# Patient Record
Sex: FEMALE | Race: WHITE
[De-identification: names, ages, dates, MRNs, and addresses within clinical notes are randomized per-mention and may not be internally consistent; named-entity substitution may affect disease eponyms.]

---

## 2018-09-05 ENCOUNTER — HOSPITAL ENCOUNTER (EMERGENCY)
Dept: HOSPITAL 25 - UCEAST | Age: 48
Discharge: HOME | End: 2018-09-05
Payer: COMMERCIAL

## 2018-09-05 VITALS — DIASTOLIC BLOOD PRESSURE: 72 MMHG | SYSTOLIC BLOOD PRESSURE: 120 MMHG

## 2018-09-05 DIAGNOSIS — Z86.718: ICD-10-CM

## 2018-09-05 DIAGNOSIS — B96.89: ICD-10-CM

## 2018-09-05 DIAGNOSIS — B37.3: ICD-10-CM

## 2018-09-05 DIAGNOSIS — N89.8: Primary | ICD-10-CM

## 2018-09-05 DIAGNOSIS — Z87.891: ICD-10-CM

## 2018-09-05 DIAGNOSIS — Z88.2: ICD-10-CM

## 2018-09-05 DIAGNOSIS — Z79.01: ICD-10-CM

## 2018-09-05 PROCEDURE — 99212 OFFICE O/P EST SF 10 MIN: CPT

## 2018-09-05 PROCEDURE — 87491 CHLMYD TRACH DNA AMP PROBE: CPT

## 2018-09-05 PROCEDURE — 87480 CANDIDA DNA DIR PROBE: CPT

## 2018-09-05 PROCEDURE — 87591 N.GONORRHOEAE DNA AMP PROB: CPT

## 2018-09-05 PROCEDURE — 87661 TRICHOMONAS VAGINALIS AMPLIF: CPT

## 2018-09-05 PROCEDURE — 87510 GARDNER VAG DNA DIR PROBE: CPT

## 2018-09-05 PROCEDURE — G0463 HOSPITAL OUTPT CLINIC VISIT: HCPCS

## 2018-09-05 NOTE — ED
GI/ HPI





- HPI Summary


HPI Summary: 


47-year-old female presents with vaginal discharge for the past couple days.  

She's been using Monistat.  She was recently treated for a uti with an 

antibiotic.  uti symptoms resolved.  Denies any pain.  Patient states discharge 

is itchy.  No foul swelling odor.  No bowel pain.  No nausea no vomiting.  No 

pain with urination.  Has history of diabetes.  is on warfarin. 








- History of Current Complaint


Chief Complaint: UCGU


Time Seen by Provider: 09/05/18 19:12


Stated Complaint: DISCHARGE


Hx Last Menstrual Period: July 2018


Pain Intensity: 0





- Allergy/Home Medications


Allergies/Adverse Reactions: 


 Allergies











Allergy/AdvReac Type Severity Reaction Status Date / Time


 


Sulfa (Sulfonamide Allergy  Headache Verified 09/05/18 19:11





Antibiotics)     


 


environmental Allergy  Congestion Uncoded 05/28/15 07:15











Home Medications: 


 Home Medications





Omeprazole CAP* [Prilosec CAP* 20 MG] 1 tab PO DAILY 09/05/18 [History 

Confirmed 09/05/18]


Warfarin TAB(*) [Coumadin TAB(*)] 1 tab PO DAILY 09/05/18 [History Confirmed 09/ 05/18]











PMH/Surg Hx/FS Hx/Imm Hx


Endocrine/Hematology History: Reports: Hx Diabetes, Hx Thyroid Disease


Cardiovascular History: Reports: Other Cardiovascular Problems/Disorders - 

blood clot disorder





- Surgical History


Surgery Procedure, Year, and Place: left 5th toe, kidney lithotripsy


Infectious Disease History: No


Infectious Disease History: 


   Denies: Traveled Outside the [place] in Last 30 Days





- Family History


Known Family History: 


   Negative: Hypertension





- Social History


Alcohol Use: Occasionally


Substance Use Type: Reports: None


Smoking Status (MU): Former Smoker





Review of Systems


Negative: Fever


Negative: Chest Pain


Negative: Shortness Of Breath


Positive: Other - vaginal discharge.  Negative: Abdominal Pain


All Other Systems Reviewed And Are Negative: Yes





Physical Exam


Triage Information Reviewed: Yes


Vital Signs On Initial Exam: 


 Initial Vitals











Temp Pulse Resp BP Pulse Ox


 


 97.5 F   67   18   120/72   98 


 


 09/05/18 19:08  09/05/18 19:08  09/05/18 19:08  09/05/18 19:08  09/05/18 19:08











Vital Signs Reviewed: Yes


Appearance: Positive: Well-Appearing


Skin: Positive: Warm, Dry


Head/Face: Positive: Normal Head/Face Inspection


Eyes: Positive: Normal, Conjunctiva Clear


ENT: Positive: Pharynx normal


Respiratory/Lung Sounds: Positive: Clear to Auscultation, Breath Sounds Present


Cardiovascular: Positive: Normal, RRR


Abdomen Description: Positive: Nontender, Soft


Bowel Sounds: Positive: Present


Pelvic Exam: Positive: External Exam Normal, Discharge - white


Musculoskeletal: Positive: Normal


Neurological: Positive: Normal


Psychiatric: Positive: Normal





Diagnostics





- Vital Signs


 Vital Signs











  Temp Pulse Resp BP Pulse Ox


 


 09/05/18 19:08  97.5 F  67  18  120/72  98














- Laboratory


Lab Statement: Any lab studies that have been ordered have been reviewed, and 

results considered in the medical decision making process.





GIGU Course/Dx





- Course


Course Of Treatment: 47-year-old female presents with vaginal discharge for the 

past couple days.  She's been using Monistat.  She was recently treated for a 

uti with an antibiotic.  uti symptoms resolved.  Denies any pain.  Patient 

states discharge is itchy.  No foul swelling odor.  No bowel pain.  No nausea 

no vomiting.  No pain with urination.  Has history of diabetes.  is on 

warfarin.  On exam nontender abdomen.  Has white discharge present.  We'll 

treat with Diflucan told may elevate inr. got cultures to make sure not BV as 

has been having more sex recently. told if comes back positive for BV will 

treat then.  Patient understands agrees with plan.





- Diagnoses


Differential Diagnoses - Female: Candidiasis, STD, Vaginitis


Provider Diagnoses: 


 Vaginal discharge








Discharge





- Sign-Out/Discharge


Documenting (check all that apply): Patient Departure


All imaging exams completed and their final reports reviewed: No Studies





- Discharge Plan


Condition: Good


Disposition: HOME


Prescriptions: 


Fluconazole [Diflucan 150 MG (NF)] 150 mg PO ONCE #1 tab


Patient Education Materials:  Yeast Infection (ED)


Referrals: 


Bertha Garcia [Primary Care Provider] - 


Additional Instructions: 


take one time dose of medication


Return to UC if develop any new or worsening symptoms





- Billing Disposition and Condition


Condition: GOOD


Disposition: Home

## 2018-09-06 NOTE — UC
- Progress Note


Progress Note: 





Culture returned with positive Gardnerella AND Candida. Was treated for yeast 

at visit. Will treat her gardnerella with Metronidazole vaginally for 5 days. 

Please make pt aware that this has a potential to INCREASE her effect of 

coumadin and increase her INR - therefore please have her check her INR on 9/

10. If at any point she develops spontaneous bleeding or increase bruising - 

she is to stop the Metronidazole and go to the ER.





Course/Dx





- Diagnoses


Provider Diagnoses: 


 Vaginal discharge








Discharge





- Sign-Out/Discharge


Documenting (check all that apply): Post-Discharge Follow Up


All imaging exams completed and their final reports reviewed: No Studies





- Discharge Plan


Condition: Good


Disposition: HOME


Prescriptions: 


Fluconazole [Diflucan 150 MG (NF)] 150 mg PO ONCE #1 tab


metroNIDAZOLE [Metronidazole 0.75 % gel] 1 applic TOPICAL BID #10 gel


Patient Education Materials:  Yeast Infection (ED)


Referrals: 


Bertha Garcia [Primary Care Provider] - 


Additional Instructions: 


take one time dose of medication


Return to UC if develop any new or worsening symptoms





- Billing Disposition and Condition


Condition: GOOD


Disposition: Home